# Patient Record
(demographics unavailable — no encounter records)

---

## 2024-12-16 NOTE — HISTORY OF PRESENT ILLNESS
[FreeTextEntry1] : 42yo M presents to discuss urinary frequency. no nocturia He saw Dr. Vega several months ago and was prescribed 5mg oxybutynin and he thinks it helped at the time.  Urinary frequency worsened since last week. He denies dietary changes.  He does report lots of stress from work  He also has known renal cyst that was incidentally noted in MRI abd in 2024

## 2024-12-16 NOTE — ASSESSMENT
[FreeTextEntry1] : Urinary frequency: check urine studies restart oxybutynin f/u with US in 3 weeks  Renal cyst: evaluate with US

## 2024-12-16 NOTE — HISTORY OF PRESENT ILLNESS
[FreeTextEntry1] : 44yo M presents to discuss urinary frequency. no nocturia He saw Dr. Vega several months ago and was prescribed 5mg oxybutynin and he thinks it helped at the time.  Urinary frequency worsened since last week. He denies dietary changes.  He does report lots of stress from work  He also has known renal cyst that was incidentally noted in MRI abd in 2024

## 2024-12-31 NOTE — HISTORY OF PRESENT ILLNESS
[FreeTextEntry1] : 42yo M presents for f/u He restarted 5mg daily oxybutynin and reports urination has improved. Still has some frequency.  He urethral pain resolved but is concerned if it might return.   He also has known renal cyst that was incidentally noted in MRI abd in 2024 12/2024: Bladder renal US neg. 0cc pvr. Image personally reviewed

## 2025-02-04 NOTE — ASSESSMENT
[FreeTextEntry1] : Imaging reviewed, no further imaging required of kidneys. Continue Oxybutynin, can use AZO prn.  Symptoms consistent with mild chronic prostatitis.  Advised to take NSAIDS but he is unable to tolerate secondary to GERT/Gastritis.  Rx for celecoxib 100 mg daily sent to the pharmacy.  If symptoms do not improve, will refer to Dr. Pandey.

## 2025-02-04 NOTE — HISTORY OF PRESENT ILLNESS
[FreeTextEntry1] : Here for follow up visit. Had prior MRI with questionable sub cm Left lower pole lesion/cyst. Underwent office renal US.  Unremarkable kidneys bilaterally.  Was seen by Dr. Peterson for urinary frequency and started on oxybutynin 5 mg.  Reports mild intermittent dysuria noted at the beginning of urination or just before urination.. Denies hematuria, fever or chills.  Office renal ultrasound findings: Left kidney appears unremarkable, Right kidney appears unremarkable. Both kidneys are normal in size and echogenicity without hydronephrosis, stones or solid masses present.